# Patient Record
Sex: FEMALE | Race: BLACK OR AFRICAN AMERICAN | NOT HISPANIC OR LATINO
[De-identification: names, ages, dates, MRNs, and addresses within clinical notes are randomized per-mention and may not be internally consistent; named-entity substitution may affect disease eponyms.]

---

## 2017-04-07 PROBLEM — R03.0 PREHYPERTENSION: Status: ACTIVE | Noted: 2017-04-07

## 2017-04-25 PROBLEM — L57.0 ACTINIC KERATOSES: Status: ACTIVE | Noted: 2017-04-25

## 2017-04-25 PROBLEM — L73.8 SEBACEOUS HYPERPLASIA OF FACE: Status: ACTIVE | Noted: 2017-04-25

## 2017-04-25 PROBLEM — L81.3 CAFÉ AU LAIT SPOT: Status: ACTIVE | Noted: 2017-04-25

## 2017-04-25 PROBLEM — D23.5 BENIGN NEOPLASM OF SKIN OF TRUNK: Status: ACTIVE | Noted: 2017-04-25

## 2017-04-25 PROBLEM — L81.3 CAF AU LAIT SPOT: Status: ACTIVE | Noted: 2017-04-25

## 2018-04-09 ENCOUNTER — IMAGING SERVICES (OUTPATIENT)
Dept: OTHER | Age: 48
End: 2018-04-09
Attending: FAMILY MEDICINE

## 2018-04-11 ENCOUNTER — IMAGING SERVICES (OUTPATIENT)
Dept: OTHER | Age: 48
End: 2018-04-11
Attending: FAMILY MEDICINE

## 2018-05-02 ENCOUNTER — IMAGING SERVICES (OUTPATIENT)
Dept: OTHER | Age: 48
End: 2018-05-02
Attending: FAMILY MEDICINE

## 2018-05-09 ENCOUNTER — IMAGING SERVICES (OUTPATIENT)
Dept: OTHER | Age: 48
End: 2018-05-09
Attending: FAMILY MEDICINE

## 2018-05-09 PROCEDURE — 88305 TISSUE EXAM BY PATHOLOGIST: CPT | Performed by: RADIOLOGY

## 2018-11-07 ENCOUNTER — IMAGING SERVICES (OUTPATIENT)
Dept: OTHER | Age: 48
End: 2018-11-07
Attending: FAMILY MEDICINE

## 2020-11-03 ENCOUNTER — IMAGING SERVICES (OUTPATIENT)
Dept: OTHER | Age: 50
End: 2020-11-03
Attending: FAMILY MEDICINE

## 2020-11-12 ENCOUNTER — IMAGING SERVICES (OUTPATIENT)
Dept: OTHER | Age: 50
End: 2020-11-12
Attending: FAMILY MEDICINE

## 2021-04-30 PROBLEM — N93.8 DUB (DYSFUNCTIONAL UTERINE BLEEDING): Status: ACTIVE | Noted: 2021-04-30

## 2021-05-12 ENCOUNTER — IMAGING SERVICES (OUTPATIENT)
Dept: OTHER | Age: 51
End: 2021-05-12
Attending: FAMILY MEDICINE

## 2023-09-08 ENCOUNTER — OFFICE VISIT (OUTPATIENT)
Facility: CLINIC | Age: 53
End: 2023-09-08
Payer: COMMERCIAL

## 2023-09-08 VITALS — HEART RATE: 74 BPM | SYSTOLIC BLOOD PRESSURE: 128 MMHG | DIASTOLIC BLOOD PRESSURE: 76 MMHG | OXYGEN SATURATION: 98 %

## 2023-09-08 DIAGNOSIS — E04.1 THYROID NODULE: Primary | ICD-10-CM

## 2023-09-08 PROCEDURE — 3078F DIAST BP <80 MM HG: CPT | Performed by: STUDENT IN AN ORGANIZED HEALTH CARE EDUCATION/TRAINING PROGRAM

## 2023-09-08 PROCEDURE — 3074F SYST BP LT 130 MM HG: CPT | Performed by: STUDENT IN AN ORGANIZED HEALTH CARE EDUCATION/TRAINING PROGRAM

## 2023-09-08 PROCEDURE — 99204 OFFICE O/P NEW MOD 45 MIN: CPT | Performed by: STUDENT IN AN ORGANIZED HEALTH CARE EDUCATION/TRAINING PROGRAM

## 2023-09-08 RX ORDER — LEVOTHYROXINE SODIUM 0.03 MG/1
25 TABLET ORAL
COMMUNITY
End: 2023-09-09

## 2023-09-08 NOTE — PATIENT INSTRUCTIONS
1) Your recent thyroid labs reflect completely normal thyroid function (normal TSH and normal peripheral hormones - T4/T3). Your TPO antibody (which correlates with Hashimoto's) was negative, therefore there is low likelihood that you will develop this in the future (though not impossible). Your thyroglobulin antibody is positive, however this is something that many normal people can have naturally and doesn't mean anything clinically in terms of your thyroid function. 2) You can stop taking levothyroxine as your thyroid is functioning great on its own. 3) With regard to your thyroid nodules, you have two on the right and one of the left which are very small (about half a centimeter) and have not grown at all since your last US in 2014. Because they look so stable and have no concerning features, you do not need to continue following these with regular imaging. 4) If at any point you feel like the nodules are growing or you're having neck symptoms in terms of food getting stuck or swelling etc, your PCP can order another US to check, but it would be very unlikely for the nodules to grow. 5) It is still reasonable for your PCP to check your thyroid function (TSH, FT4) every 1-2 years.

## 2023-09-08 NOTE — H&P
Endocrinology Clinic Note    Name: Annette Pinedo    Date: 9/9/2023      HISTORY OF PRESENT ILLNESS   Annette Pinedo is a 45year old female with PMHx significant for nontoxic thyroid nodules who presents for consultation for hypothyroidism. Patient states she was diagnosed with hypothyroidism after recent thyroid function testing was drawn by her PCP in August 2023; she was told her thyroid function was low and she was started on levothyroxine 25mcg daily. She was been taking this for approximately four weeks. She was also found to have several subcentimeter nodules on head/neck US performed in 2014 and follow up thyroid US was performed in 2014. Patient denies any history of dysphagia, neck swelling, or other compressive symptoms. Reports that she initially had the blood testing drawn due to several symptoms such as weight gain, weak nails, and fatigue. She has also had more elevated BP recently, not on any medication. She has been going through menopause; following with jf who started her on oral HRT and she feels the hot flashes, etc have improved with this. No other current complaints. Does not take any vitamins/supplement or biotin. Other relevant information:    (+) Fhx of thyroid disease:  Grandfather had thyroid disease  Mother has had hypothyroidism (suspected Hashimoto's) for >20 years, takes thyroid hormone    Risk factors for thyroid cancer:  - No childhood radiation exposure  - No exposure to nuclear material  - No familial cancer syndromes/MEN      REVIEW OF SYSTEMS  Ten point review of systems has been performed and is otherwise negative and/or non-contributory, except as described above. Medications:     Current Outpatient Medications:     levothyroxine 25 MCG Oral Tab, Take 1 tablet (25 mcg total) by mouth before breakfast., Disp: , Rfl:     Norethin-Eth Estrad-Fe Biphas (LO LOESTRIN FE) 1 MG-10 MCG / 10 MCG Oral Tab, Take 1 tablet by mouth daily. , Disp: 90 tablet, Rfl: 2    betamethasone dipropionate 0.05 % External Cream, Apply 1-2 times a day to AA of hands, Disp: 45 g, Rfl: 1    Meloxicam 7.5 MG Oral Tab, , Disp: , Rfl:     acyclovir 400 MG Oral Tab, Take 1 tablet (400 mg total) by mouth 3 (three) times daily. , Disp: 15 tablet, Rfl: 5    PEG 3350-KCl-Na Bicarb-NaCl 420 g Oral Recon Soln, Take as directed per MD (Patient not taking: Reported on 10/25/2021), Disp: 4000 mL, Rfl: 0    Fluticasone Propionate 50 MCG/ACT Nasal Suspension, 2 sprays by Each Nare route daily. , Disp: , Rfl:     cetirizine (ZYRTEC) 10 MG Oral Tab, Take 1 tablet by mouth daily. (Patient not taking: Reported on 2022), Disp: , Rfl:      Allergies:     Cat Hair Extract        UNKNOWN  Seasonal                ITCHING    Social History:   Social History    Socioeconomic History      Marital status:     Tobacco Use      Smoking status: Never      Smokeless tobacco: Never    Vaping Use      Vaping Use: Never used    Substance and Sexual Activity      Alcohol use: Yes        Comment: scoially      Drug use: No      Sexual activity: Yes        Partners: Male    Other Topics      Concerns:        Caffeine Concern: Yes          Coffee 2 cups daily      Medical History:   Past Medical History:   Diagnosis Date    Congenital abnormality of uterus 2006    Uterine septum. Laser surgery removed polyps and scar tissue    Congenital malformation of uterine adnexa     Infertility 2006    IVF x 1         Surgical history:   Past Surgical History:   Procedure Laterality Date          COLONOSCOPY N/A 2020    Procedure: COLONOSCOPY, POSSIBLE BIOPSY, POSSIBLE POLYPECTOMY 47507;  Surgeon:  [place]ia Moya MD;  Location: [address] [place] [address]    NEEDLE BIOPSY LEFT  2018       PHYSICAL EXAMINATION:  /76   Pulse 74   SpO2 98%     CONSTITUTIONAL:  awake, alert, cooperative, no apparent distress, and appears stated age  PSYCH: normal affect, cooperative  EYES:  No proptosis,  conjunctiva normal  ENT: Normocephalic, atraumatic  NECK:  Supple, symmetrical, thyroid not enlarged and no palpable nodules  LUNGS: breathing comfortably  CARDIOVASCULAR:  regular rate and rhythm  ABDOMEN:  soft, nontender  SKIN:  no rashes and no lesions  EXTREMITIES: no edema      Labs:  Lab Results   Component Value Date    T4F 0.70 09/03/2014    TSH 2.670 12/16/2020      Recent Labs     12/16/20  0715   TSH 2.670      Labs 8/7/23:  Ref Range & Units 1 mo ago Comments   TSH  0.270 - 4.200 mIU/L 2.490      Ref Range & Units 1 mo ago   Free T4  0.93 - 1.70 ng/dL 0.94     Ref Range & Units 1 mo ago   Free T3  2.00 - 4.40 pg/mL 3.00     Ref Range & Units 1 mo ago   Thyroid Peroxidase (TPO) Ab  0 - 34 IU/mL <9     Ref Range & Units 1 mo ago Comments   Thyroglobulin Antibody  0.0 - 0.9 IU/mL 30.7 High       Interpretation of labs on 9/8/23:  - All TSH/free thyroid hormones on file have been normal since 2004, no evidence of biochemical dysfunction. Recent labs in August 2023 show low-normal TSH, normal T3/T4 = euthyroidism.  - TPO antibody is negative, which is generally predictive of autoimmune potential  - Thyroglobulin antibody is positive which may denote some autoimmune potential, however but itself is not diagnostic of any hypo- or hyperthyroidism. Imaging:    US head/neck 9/3/14:    FINDINGS:   RIGHT LOBE:  7.3 x 1.4 x 2.0 cm, 9.8 mL. Normal echogenicity. There is 2                round hypoechoic masses one in the mid lobe measuring 0.4 x                0.3 x 0.4 cm the other in the lower pole measuring 0.5 x 0.3 x                0.4 cm., The latter shows possibly slightly internal blood                flow with color Doppler. LEFT LOBE:   6.5 x 1.1 x 1.8 cm, 6.4 mL. Normal echogenicity. Hypoechoic                nodule in the mid lobe anteriorly 0.5 x 0.3 x 0.5 cm showing                slight internal blood flow with color Doppler. ISTHMUS:     0.2 cm AP diameter in the midline. Normal echogenicity.   No masses. OTHER:       No masses or adenopathy. CONCLUSION:   Goiter with small nodules 0.5 cm or smaller bilaterally without suspicious   characteristics at this time. Follow up in one year is suggested. Electronically Verified                                           Herman Robertson. Walter Quinones, [address] 09/03/2014                                                                            16:11       US thyroid 8/18/23:    FINDINGS:     RIGHT LOBE: The right lobe of the thyroid gland measures 5.6 x 2.1 x 1.4 cm. Total volume is 7.7 mL. LEFT LOBE: The left lobe of the thyroid gland measures 6.3 x 1.8 x 1.6 cm. Total volume is 8.4 mL. ISTHMUS: The isthmus measures up to 0.2 cm. ECHOTEXTURE:    The right thyroid gland echogenicity appears homogeneous. The left thyroid gland echogenicity appears homogeneous. VASCULARITY:    The right thyroid gland vascularity appears normal.   The left thyroid gland vascularity appears normal.     NODULES:       Nodule 1. Right Lower Pole, measuring 0.4 x 0.3 x 0.3 cm. No priors   Solid or almost completely solid: 2 points,  hypoechoic: 2 points,  wider than tall: 0 points,   smooth: 0 points, no calcifications: 0 points. TI-RADS Score: TR-4          Nodule 2. Right Lower Pole, measuring 0.5 x 0.4 x 0.3 cm. No priors   Solid or almost completely solid: 2 points,  hypoechoic: 2 points,  wider than tall: 0 points,   smooth: 0 points, no calcifications: 0 points. TI-RADS Score: TR-4          Nodule 3. Left Lower Pole, measuring 0.6 x 0.5 x 0.4 cm. No priors   Solid or almost completely solid: 2 points,  hypoechoic: 2 points,  wider than tall: 0 points,   smooth: 0 points, no calcifications: 0 points. TI-RADS Score: TR-4       Interpretation of imaging 9/8/23:  Subcentimeter bilateral nodules have not changed in size since 2014. Though TR4, do not meet biopsy criteria due to small size. ASSESSMENT/PLAN:  Fransisca Machado is a 45year old female with PMHx significant for nontoxic thyroid nodules who presents for consultation for possible hypothyroidism. #Concern for hypothyroidism  - Patient prescribed 25mcg levothyroxine which she has been taking for approximately one month, however recent thyroid function tests drawn prior to initiation of LT4 show normal TSH, normal T3, and low-normal T4 which are reflective of euthyroidism/normal thyroid function. Suggest discontinuing levothyroxine at this time. - TPO antibodies (highly correlated with Hashimoto's) negative  - AntiTG antibodies positive, but patient remains euthyroid  - May follow TFTs periodically with PCP   - With regard to fatigue, etc consider vitamin D, iron studies, etc to assess non-thyroid-related etiologies    #Bilateral thyroid nodules  - Two right and one left subcentimeter nodules have been present and essentially unchanged in size since 2014 (nearly 10 years)  - Though graded as TR4 for being solid/hypoechoic, the lack of change in size for 10 years is very reassuring. Nodules are too small to biopsy and do not meet criteria. - [facility] suggests surveilling TR4 nodules which are at least 1cm in size. Patient's stable subcentimeter nodules do not meet criteria. - Follow up thyroid US not necessary unless patient experiences any symptoms of growth or compression. The above plan was discussed in detail with the patient who verbalized understanding and agreement. A total of 45 minutes was spent today on obtaining history, reviewing pertinent labs and imaging, evaluating patient, providing multiple treatment options, and completing documentation.      9/9/2023  Manuela Gonsalves [facility] Endocrinology

## 2025-03-21 ENCOUNTER — HOSPITAL ENCOUNTER (OUTPATIENT)
Dept: MAMMOGRAPHY | Age: 55
Discharge: HOME | End: 2025-03-21

## 2025-03-21 DIAGNOSIS — Z12.31 ENCOUNTER FOR SCREENING MAMMOGRAM FOR MALIGNANT NEOPLASM OF BREAST: ICD-10-CM

## 2025-03-21 PROCEDURE — 77063 BREAST TOMOSYNTHESIS BI: CPT
